# Patient Record
(demographics unavailable — no encounter records)

---

## 2024-10-09 NOTE — DISCUSSION/SUMMARY
[de-identified] : Reviewed all X Ray images with patient today and interpretation was provided. Stat MRI of right knee to eval ACL tear. Patient will follow up after MRI.     ----------------------------------------------- Home Exercise The patient is instructed on a home exercise program.  BEV CARDENAS Acting as a Scribe for Dr. Margaret TOLEDO, Bev Cardenas, attest that this documentation has been prepared under the direction and in the presence of Provider Dr. Robles.  Activity Modification The patient was advised to modify their activities.  Dx / Natural History The patient was advised of the diagnosis. The natural history of the pathology was explained in full to the patient in layman's terms. Several different treatment options were discussed and explained in full to the patient including the risks and benefits of both surgical and non-surgical treatments.  All questions and concerns were answered.  Pain Guide Activities The patient was advised to let pain guide the gradual advancement of activities.  RICE I explained to the patient that rest, ice, compression, and elevation would benefit them. They may return to activity after follow-up or when they no longer have any pain.  The patient's current medication management of their orthopedic diagnosis was reviewed today: (1) We discussed a comprehensive treatment plan that included possible pharmaceutical management involving the use of prescription strength medications including but not limited to options such as oral Naprosyn 500mg BID, once daily Meloxicam 15 mg, or 500-650 mg Tylenol versus over the counter oral medications and topical prescription NSAID Pennsaid vs over the counter Voltaren gel. (2) There is a moderate risk of morbidity with further treatment, especially from use of prescription strength medications and possible side effects of these medications which include upset stomach with oral medications, skin reactions to topical medications and cardiac/renal issues with long term use. (3) I recommended that the patient follow-up with their medical physician to discuss any significant specific potential issues with long term medication use such as interactions with current medications or with exacerbation of underlying medical comorbidities. (4) The benefits and risks associated with use of injectable, oral or topical, prescription and over the counter anti-inflammatory medications were discussed with the patient. The patient voiced understanding of the risks including but not limited to bleeding, stroke, kidney dysfunction, heart disease, and were referred to the black box warning label for further information.

## 2024-10-09 NOTE — PHYSICAL EXAM
[de-identified] : Neurovascularly intact distally   Right Knee:  No effusion, clean and dry incisions, intact skin, no fluctuance, no sign of infection, no wound erythema, no induration, no drainage. +lachman +anterior drawer Proximal MCL tenderness 1+ valgus with pain

## 2024-10-09 NOTE — PHYSICAL EXAM
[de-identified] : Neurovascularly intact distally   Right Knee:  No effusion, clean and dry incisions, intact skin, no fluctuance, no sign of infection, no wound erythema, no induration, no drainage. +lachman +anterior drawer Proximal MCL tenderness 1+ valgus with pain

## 2024-10-09 NOTE — DATA REVIEWED
[FreeTextEntry1] : 10/8/24 [facility] X-RAY Right Knee 4 views: This scan was reviewed and interpreted by Dr. Robles, and his findings are- well-placed hardware, otherwise unremarkable

## 2024-10-09 NOTE — ADDENDUM
[FreeTextEntry1] : Documented by Danny Wells acting as a scribe for Dr. Robles and Foster Johnson PA-C on 10/08/2024 and was presence for the following sections: Physical Exam; Data Reviewed; Assessment; Discussion/Summary. All medical record entries made by the Scribe were at my, Dr. Robles, and Foster Johnson, direction and personally dictated by me on 10/08/2024. I have reviewed the chart and agree that the record accurately reflects my personal performance of the history, physical exam, procedure and imaging.

## 2024-10-09 NOTE — DISCUSSION/SUMMARY
[de-identified] : Reviewed all X Ray images with patient today and interpretation was provided. Stat MRI of right knee to eval ACL tear. Patient will follow up after MRI.     ----------------------------------------------- Home Exercise The patient is instructed on a home exercise program.  BEV CARDENAS Acting as a Scribe for Dr. Margaret TOLEDO, Bev Cardenas, attest that this documentation has been prepared under the direction and in the presence of Provider Dr. Robles.  Activity Modification The patient was advised to modify their activities.  Dx / Natural History The patient was advised of the diagnosis. The natural history of the pathology was explained in full to the patient in layman's terms. Several different treatment options were discussed and explained in full to the patient including the risks and benefits of both surgical and non-surgical treatments.  All questions and concerns were answered.  Pain Guide Activities The patient was advised to let pain guide the gradual advancement of activities.  RICE I explained to the patient that rest, ice, compression, and elevation would benefit them. They may return to activity after follow-up or when they no longer have any pain.  The patient's current medication management of their orthopedic diagnosis was reviewed today: (1) We discussed a comprehensive treatment plan that included possible pharmaceutical management involving the use of prescription strength medications including but not limited to options such as oral Naprosyn 500mg BID, once daily Meloxicam 15 mg, or 500-650 mg Tylenol versus over the counter oral medications and topical prescription NSAID Pennsaid vs over the counter Voltaren gel. (2) There is a moderate risk of morbidity with further treatment, especially from use of prescription strength medications and possible side effects of these medications which include upset stomach with oral medications, skin reactions to topical medications and cardiac/renal issues with long term use. (3) I recommended that the patient follow-up with their medical physician to discuss any significant specific potential issues with long term medication use such as interactions with current medications or with exacerbation of underlying medical comorbidities. (4) The benefits and risks associated with use of injectable, oral or topical, prescription and over the counter anti-inflammatory medications were discussed with the patient. The patient voiced understanding of the risks including but not limited to bleeding, stroke, kidney dysfunction, heart disease, and were referred to the black box warning label for further information.

## 2024-10-09 NOTE — HISTORY OF PRESENT ILLNESS
[de-identified] : The patient is a 16 year old female presenting for FUV for the R knee. Pain: At Rest: 2/10 With Activity: 6/10 Mechanism of injury: Patient reported feeling a "pop" in the involved knee while playing basketball. This is NOT a Work Related Injury being treated under Worker's Compensation. This is NOT an athletic injury occurring associated with an interscholastic or organized sports team. Treatment/Imaging/Studies Since Last Visit: HEP   Reports Available For Review Today: NONE Out of work/sport: student School/Sport/Position/Occupation: [employer] Change since last visit: Patients states knee buckled and gave out on 10/4/24 at rec basketball game. Reports wearing brace at time of injury Additional Information: The patient is s/p R knee arthroscopic JOSAFAT, excision of cyclops lesion with limited synovectomy, open quadriceps tendon scar excision with revision scar closure. Date of Surgery: 10/19/23. D/C formal PT august 2024

## 2024-10-09 NOTE — HISTORY OF PRESENT ILLNESS
[de-identified] : The patient is a 16 year old female presenting for FUV for the R knee. Pain: At Rest: 2/10 With Activity: 6/10 Mechanism of injury: Patient reported feeling a "pop" in the involved knee while playing basketball. This is NOT a Work Related Injury being treated under Worker's Compensation. This is NOT an athletic injury occurring associated with an interscholastic or organized sports team. Treatment/Imaging/Studies Since Last Visit: HEP   Reports Available For Review Today: NONE Out of work/sport: student School/Sport/Position/Occupation: [employer] Change since last visit: Patients states knee buckled and gave out on 10/4/24 at rec basketball game. Reports wearing brace at time of injury Additional Information: The patient is s/p R knee arthroscopic JOSAFAT, excision of cyclops lesion with limited synovectomy, open quadriceps tendon scar excision with revision scar closure. Date of Surgery: 10/19/23. D/C formal PT august 2024

## 2024-10-13 NOTE — PHYSICAL EXAM
[de-identified] : Neurovascularly intact distally   Right Knee:  No effusion, clean and dry incisions, intact skin, no fluctuance, no sign of infection, no wound erythema, no induration, no drainage. +lachman +anterior drawer Proximal MCL tenderness Quad atrophy present 1+ valgus with pain

## 2024-10-13 NOTE — HISTORY OF PRESENT ILLNESS
[de-identified] : The patient is a 16 year old female presenting for FUV for the R knee. Pain: At Rest: 3/10 With Activity: 7/10 Mechanism of injury: Patient reported feeling a "pop" in the involved knee while playing basketball. This is NOT a Work Related Injury being treated under Worker's Compensation. This is NOT an athletic injury occurring associated with an interscholastic or organized sports team. Treatment/Imaging/Studies Since Last Visit: HEP, MRI @ [facility]  Reports Available For Review Today: NONE Out of work/sport: student School/Sport/Position/Occupation: [employer] Change since last visit: MRI  Additional Information: The patient is s/p R knee arthroscopic JOSAFAT, excision of cyclops lesion with limited synovectomy, open quadriceps tendon scar excision with revision scar closure. Date of Surgery: 10/19/23. D/C formal PT august 2024

## 2024-10-13 NOTE — DISCUSSION/SUMMARY
[de-identified] : Reviewed all MRI images with patient today and interpretation was provided. Physical therapy prescribed for strengthening and stretching.  Patient was advised to remain out of sports/gym for 1 month at this time. f/u 1 month for revaluation and to discuss clearance  ----------------------------------------------- Home Exercise The patient is instructed on a home exercise program.  Tone Coley Acting as a Scribe for Dr. Margaret TOLEDO, Tone Coley, attest that this documentation has been prepared under the direction and in the presence of Provider Dr. Robles.  Activity Modification The patient was advised to modify their activities.  Dx / Natural History The patient was advised of the diagnosis. The natural history of the pathology was explained in full to the patient in layman's terms. Several different treatment options were discussed and explained in full to the patient including the risks and benefits of both surgical and non-surgical treatments.  All questions and concerns were answered.  Pain Guide Activities The patient was advised to let pain guide the gradual advancement of activities.  ROSETTA TOLEDO explained to the patient that rest, ice, compression, and elevation would benefit them. They may return to activity after follow-up or when they no longer have any pain.  The patient's current medication management of their orthopedic diagnosis was reviewed today: (1) We discussed a comprehensive treatment plan that included possible pharmaceutical management involving the use of prescription strength medications including but not limited to options such as oral Naprosyn 500mg BID, once daily Meloxicam 15 mg, or 500-650 mg Tylenol versus over the counter oral medications and topical prescription NSAID Pennsaid vs over the counter Voltaren gel. (2) There is a moderate risk of morbidity with further treatment, especially from use of prescription strength medications and possible side effects of these medications which include upset stomach with oral medications, skin reactions to topical medications and cardiac/renal issues with long term use. (3) I recommended that the patient follow-up with their medical physician to discuss any significant specific potential issues with long term medication use such as interactions with current medications or with exacerbation of underlying medical comorbidities. (4) The benefits and risks associated with use of injectable, oral or topical, prescription and over the counter anti-inflammatory medications were discussed with the patient. The patient voiced understanding of the risks including but not limited to bleeding, stroke, kidney dysfunction, heart disease, and were referred to the black box warning label for further information.

## 2024-10-13 NOTE — PHYSICAL EXAM
[de-identified] : Neurovascularly intact distally   Right Knee:  No effusion, clean and dry incisions, intact skin, no fluctuance, no sign of infection, no wound erythema, no induration, no drainage. +lachman +anterior drawer Proximal MCL tenderness Quad atrophy present 1+ valgus with pain

## 2024-10-13 NOTE — DISCUSSION/SUMMARY
[de-identified] : Reviewed all MRI images with patient today and interpretation was provided. Physical therapy prescribed for strengthening and stretching.  Patient was advised to remain out of sports/gym for 1 month at this time. f/u 1 month for revaluation and to discuss clearance  ----------------------------------------------- Home Exercise The patient is instructed on a home exercise program.  Tone Coley Acting as a Scribe for Dr. Margaret TOLEDO, Tone Coley, attest that this documentation has been prepared under the direction and in the presence of Provider Dr. Robles.  Activity Modification The patient was advised to modify their activities.  Dx / Natural History The patient was advised of the diagnosis. The natural history of the pathology was explained in full to the patient in layman's terms. Several different treatment options were discussed and explained in full to the patient including the risks and benefits of both surgical and non-surgical treatments.  All questions and concerns were answered.  Pain Guide Activities The patient was advised to let pain guide the gradual advancement of activities.  ROSETTA TOLEDO explained to the patient that rest, ice, compression, and elevation would benefit them. They may return to activity after follow-up or when they no longer have any pain.  The patient's current medication management of their orthopedic diagnosis was reviewed today: (1) We discussed a comprehensive treatment plan that included possible pharmaceutical management involving the use of prescription strength medications including but not limited to options such as oral Naprosyn 500mg BID, once daily Meloxicam 15 mg, or 500-650 mg Tylenol versus over the counter oral medications and topical prescription NSAID Pennsaid vs over the counter Voltaren gel. (2) There is a moderate risk of morbidity with further treatment, especially from use of prescription strength medications and possible side effects of these medications which include upset stomach with oral medications, skin reactions to topical medications and cardiac/renal issues with long term use. (3) I recommended that the patient follow-up with their medical physician to discuss any significant specific potential issues with long term medication use such as interactions with current medications or with exacerbation of underlying medical comorbidities. (4) The benefits and risks associated with use of injectable, oral or topical, prescription and over the counter anti-inflammatory medications were discussed with the patient. The patient voiced understanding of the risks including but not limited to bleeding, stroke, kidney dysfunction, heart disease, and were referred to the black box warning label for further information.

## 2024-10-13 NOTE — HISTORY OF PRESENT ILLNESS
[de-identified] : The patient is a 16 year old female presenting for FUV for the R knee. Pain: At Rest: 3/10 With Activity: 7/10 Mechanism of injury: Patient reported feeling a "pop" in the involved knee while playing basketball. This is NOT a Work Related Injury being treated under Worker's Compensation. This is NOT an athletic injury occurring associated with an interscholastic or organized sports team. Treatment/Imaging/Studies Since Last Visit: HEP, MRI @ [facility]  Reports Available For Review Today: NONE Out of work/sport: student School/Sport/Position/Occupation: [employer] Change since last visit: MRI  Additional Information: The patient is s/p R knee arthroscopic JOSAFAT, excision of cyclops lesion with limited synovectomy, open quadriceps tendon scar excision with revision scar closure. Date of Surgery: 10/19/23. D/C formal PT august 2024

## 2024-10-13 NOTE — DATA REVIEWED
[FreeTextEntry1] : 10/9/24 [facility] MRI RT KNEE Sprain of ACL with no evidence of retear of Reconstruction graft

## 2024-11-13 NOTE — REVIEW OF SYSTEMS
Action Requested: Summary for Provider     []  Critical Lab, Recommendations Needed  [] Need Additional Advice  []   FYI    []   Need Orders  [] Need Medications Sent to Pharmacy  []  Other     SUMMARY: Patient requests appt with LB for intermittent wheezi [Negative] : Heme/Lymph

## 2024-11-16 NOTE — HISTORY OF PRESENT ILLNESS
[de-identified] : The patient is a 16 year old female presenting for FUV for the R knee. Pain: At Rest: 3/10 With Activity: 7/10 Mechanism of injury: Patient reported feeling a "pop" in the involved knee while playing basketball. This is NOT a Work Related Injury being treated under Worker's Compensation. This is NOT an athletic injury occurring associated with an interscholastic or organized sports team. Treatment/Imaging/Studies Since Last Visit: HEP, MRI @ [facility]  Reports Available For Review Today: NONE Out of work/sport: student School/Sport/Position/Occupation: [employer] Change since last visit: MRI  Additional Information: The patient is s/p R knee arthroscopic JOSAFAT, excision of cyclops lesion with limited synovectomy, open quadriceps tendon scar excision with revision scar closure. Date of Surgery: 10/19/23. D/C formal PT august 2024 11/13/24: Follow up visit for right knee pain. Patient admits to finishing PT and feeling very well. Request back to gym note.

## 2024-11-16 NOTE — ADDENDUM
[FreeTextEntry1] : Documented by Danny Wells acting as a scribe for Dr. Robles and Foster Johnson PA-C on 11/13/2024 and was presence for the following sections: Physical Exam; Data Reviewed; Assessment; Discussion/Summary. All medical record entries made by the Scribe were at my, Dr. Robles, and Foster Johnson, direction and personally dictated by me on 11/13/2024. I have reviewed the chart and agree that the record accurately reflects my personal performance of the history, physical exam, procedure and imaging.

## 2024-11-16 NOTE — DISCUSSION/SUMMARY
[de-identified] : Patient is doing well during today's visit. Patient will continue physical therapy for strengthening and stretching. Patient is cleared for gym and sport. Patient advised to ease into activity. Patient will follow up as needed.     ----------------------------------------------- Home Exercise The patient is instructed on a home exercise program.  BEV CARDENAS Acting as a Scribe for Dr. Margaret TOLEDO, Bev Cardenas, attest that this documentation has been prepared under the direction and in the presence of Provider Dr. Robles.  Activity Modification The patient was advised to modify their activities.  Dx / Natural History The patient was advised of the diagnosis. The natural history of the pathology was explained in full to the patient in layman's terms. Several different treatment options were discussed and explained in full to the patient including the risks and benefits of both surgical and non-surgical treatments.  All questions and concerns were answered.  Pain Guide Activities The patient was advised to let pain guide the gradual advancement of activities.  RICE I explained to the patient that rest, ice, compression, and elevation would benefit them. They may return to activity after follow-up or when they no longer have any pain.  The patient's current medication management of their orthopedic diagnosis was reviewed today: (1) We discussed a comprehensive treatment plan that included possible pharmaceutical management involving the use of prescription strength medications including but not limited to options such as oral Naprosyn 500mg BID, once daily Meloxicam 15 mg, or 500-650 mg Tylenol versus over the counter oral medications and topical prescription NSAID Pennsaid vs over the counter Voltaren gel. (2) There is a moderate risk of morbidity with further treatment, especially from use of prescription strength medications and possible side effects of these medications which include upset stomach with oral medications, skin reactions to topical medications and cardiac/renal issues with long term use. (3) I recommended that the patient follow-up with their medical physician to discuss any significant specific potential issues with long term medication use such as interactions with current medications or with exacerbation of underlying medical comorbidities. (4) The benefits and risks associated with use of injectable, oral or topical, prescription and over the counter anti-inflammatory medications were discussed with the patient. The patient voiced understanding of the risks including but not limited to bleeding, stroke, kidney dysfunction, heart disease, and were referred to the black box warning label for further information.

## 2024-11-16 NOTE — DISCUSSION/SUMMARY
[de-identified] : Patient is doing well during today's visit. Patient will continue physical therapy for strengthening and stretching. Patient is cleared for gym and sport. Patient advised to ease into activity. Patient will follow up as needed.     ----------------------------------------------- Home Exercise The patient is instructed on a home exercise program.  BEV CARDENAS Acting as a Scribe for Dr. Margaret TOLEDO, Bev Cardenas, attest that this documentation has been prepared under the direction and in the presence of Provider Dr. Robles.  Activity Modification The patient was advised to modify their activities.  Dx / Natural History The patient was advised of the diagnosis. The natural history of the pathology was explained in full to the patient in layman's terms. Several different treatment options were discussed and explained in full to the patient including the risks and benefits of both surgical and non-surgical treatments.  All questions and concerns were answered.  Pain Guide Activities The patient was advised to let pain guide the gradual advancement of activities.  RICE I explained to the patient that rest, ice, compression, and elevation would benefit them. They may return to activity after follow-up or when they no longer have any pain.  The patient's current medication management of their orthopedic diagnosis was reviewed today: (1) We discussed a comprehensive treatment plan that included possible pharmaceutical management involving the use of prescription strength medications including but not limited to options such as oral Naprosyn 500mg BID, once daily Meloxicam 15 mg, or 500-650 mg Tylenol versus over the counter oral medications and topical prescription NSAID Pennsaid vs over the counter Voltaren gel. (2) There is a moderate risk of morbidity with further treatment, especially from use of prescription strength medications and possible side effects of these medications which include upset stomach with oral medications, skin reactions to topical medications and cardiac/renal issues with long term use. (3) I recommended that the patient follow-up with their medical physician to discuss any significant specific potential issues with long term medication use such as interactions with current medications or with exacerbation of underlying medical comorbidities. (4) The benefits and risks associated with use of injectable, oral or topical, prescription and over the counter anti-inflammatory medications were discussed with the patient. The patient voiced understanding of the risks including but not limited to bleeding, stroke, kidney dysfunction, heart disease, and were referred to the black box warning label for further information.

## 2024-11-16 NOTE — HISTORY OF PRESENT ILLNESS
[de-identified] : The patient is a 16 year old female presenting for FUV for the R knee. Pain: At Rest: 3/10 With Activity: 7/10 Mechanism of injury: Patient reported feeling a "pop" in the involved knee while playing basketball. This is NOT a Work Related Injury being treated under Worker's Compensation. This is NOT an athletic injury occurring associated with an interscholastic or organized sports team. Treatment/Imaging/Studies Since Last Visit: HEP, MRI @ [facility]  Reports Available For Review Today: NONE Out of work/sport: student School/Sport/Position/Occupation: [employer] Change since last visit: MRI  Additional Information: The patient is s/p R knee arthroscopic JOSAFAT, excision of cyclops lesion with limited synovectomy, open quadriceps tendon scar excision with revision scar closure. Date of Surgery: 10/19/23. D/C formal PT august 2024 11/13/24: Follow up visit for right knee pain. Patient admits to finishing PT and feeling very well. Request back to gym note.

## 2024-11-16 NOTE — PHYSICAL EXAM
[de-identified] : Neurovascularly intact distally   Right Knee:  No effusion, clean and dry incisions, intact skin, no fluctuance, no sign of infection, no wound erythema, no induration, no drainage. 5-/5 quad strength can straight leg raise against resistance can perform piston squat no pain on exam full ROM no effusion ligamental stable -lachman

## 2024-11-16 NOTE — PHYSICAL EXAM
[de-identified] : Neurovascularly intact distally   Right Knee:  No effusion, clean and dry incisions, intact skin, no fluctuance, no sign of infection, no wound erythema, no induration, no drainage. 5-/5 quad strength can straight leg raise against resistance can perform piston squat no pain on exam full ROM no effusion ligamental stable -lachman

## 2024-12-13 NOTE — REVIEW OF SYSTEMS
Roxana Godoy is calling to request a refill on the following medication(s):    Medication Request:  Requested Prescriptions     Pending Prescriptions Disp Refills    venlafaxine (EFFEXOR XR) 150 MG extended release capsule 90 capsule 0       Last Visit Date (If Applicable):  9/23/1104 In office    Next Visit Date:    Visit date not found [Joint Pain] : joint pain [Negative] : Heme/Lymph

## 2024-12-15 NOTE — HISTORY OF PRESENT ILLNESS
[de-identified] : The patient is a 16 year old [RIGHT/LEFT] hand dominant female who presents today complaining of RT KNEE pain Date of Injury/Onset:  Yesterday Pain:    At Rest:  4/10  With Activity:  7/10  Mechanism of injury: Patient was playing basketball yesterday and came down from layout with brace on and her leg gave out.  This is NOT a Work Related Injury being treated under Worker's Compensation. This is NOT an athletic injury occurring associated with an interscholastic or organized sports team. Quality of symptoms:  Black and blue, swollen pain on outer part of knee.  Improves with: Icing and keeping it straight  Worse with: bending  Prior treatment: _ Prior Imaging: _ Out of work/sport: Basketball since yesterday School/Sport/Position/Occupation: Basketball at [employer]  Additional Information: Patient states this is not the first time it gave out or tweaked. History of torn ACL.

## 2024-12-15 NOTE — HISTORY OF PRESENT ILLNESS
[de-identified] : The patient is a 16 year old [RIGHT/LEFT] hand dominant female who presents today complaining of RT KNEE pain Date of Injury/Onset:  Yesterday Pain:    At Rest:  4/10  With Activity:  7/10  Mechanism of injury: Patient was playing basketball yesterday and came down from layout with brace on and her leg gave out.  This is NOT a Work Related Injury being treated under Worker's Compensation. This is NOT an athletic injury occurring associated with an interscholastic or organized sports team. Quality of symptoms:  Black and blue, swollen pain on outer part of knee.  Improves with: Icing and keeping it straight  Worse with: bending  Prior treatment: _ Prior Imaging: _ Out of work/sport: Basketball since yesterday School/Sport/Position/Occupation: Basketball at [employer]  Additional Information: Patient states this is not the first time it gave out or tweaked. History of torn ACL.

## 2024-12-15 NOTE — DATA REVIEWED
[FreeTextEntry1] : 12/13/24 [facility] X-Ray Examination of the RIGHT KNEE: 4 views:  This scan was reviewed and interpreted by Dr. Robles, and his findings are- Unremarkable

## 2024-12-15 NOTE — DISCUSSION/SUMMARY
[de-identified] :  Reviewed all X-ray images with patient, interpretation was provided. Stat MRI of the right knee to evaluate for ACL tear Follow up after scan.    ----------------------------------------------- Home Exercise The patient is instructed on a home exercise program.  LYNN PASTOR Acting as a Scribe for Dr. Margaret TOLEDO, Lynn Pastor, attest that this documentation has been prepared under the direction and in the presence of Provider Rafi Robles MD.  Activity Modification The patient was advised to modify their activities.  Dx / Natural History The patient was advised of the diagnosis.  The natural history of the pathology was explained in full to the patient in layman's terms.  Several different treatment options were discussed and explained in full to the patient including the risks and benefits of both surgical and non-surgical treatments.  All questions and concerns were answered.  Pain Guide Activities The patient was advised to let pain guide the gradual advancement of activities.  RICE I explained to the patient that rest, ice, compression, and elevation would benefit them.  They may return to activity after follow-up or when they no longer have any pain.  The patient's current medication management of their orthopedic diagnosis was reviewed today: (1) We discussed a comprehensive treatment plan that included possible pharmaceutical management involving the use of prescription strength medications including but not limited to options such as oral Naprosyn 500mg BID, once daily Meloxicam 15 mg, or 500-650 mg Tylenol versus over the counter oral medications and topical prescription NSAID Pennsaid vs over the counter Voltaren gel. (2) There is a moderate risk of morbidity with further treatment, especially from use of prescription strength medications and possible side effects of these medications which include upset stomach with oral medications, skin reactions to topical medications and cardiac/renal issues with long term use. (3) I recommended that the patient follow-up with their medical physician to discuss any significant specific potential issues with long term medication use such as interactions with current medications or with exacerbation of underlying medical comorbidities. (4) The benefits and risks associated with use of injectable, oral or topical, prescription and over the counter anti-inflammatory medications were discussed with the patient. The patient voiced understanding of the risks including but not limited to bleeding, stroke, kidney dysfunction, heart disease, and were referred to the black box warning label for further information.

## 2024-12-15 NOTE — DISCUSSION/SUMMARY
[de-identified] :  Reviewed all X-ray images with patient, interpretation was provided. Stat MRI of the right knee to evaluate for ACL tear Follow up after scan.    ----------------------------------------------- Home Exercise The patient is instructed on a home exercise program.  LYNN PASTOR Acting as a Scribe for Dr. Margaret TOLEDO, Lynn Pastor, attest that this documentation has been prepared under the direction and in the presence of Provider Rafi Robles MD.  Activity Modification The patient was advised to modify their activities.  Dx / Natural History The patient was advised of the diagnosis.  The natural history of the pathology was explained in full to the patient in layman's terms.  Several different treatment options were discussed and explained in full to the patient including the risks and benefits of both surgical and non-surgical treatments.  All questions and concerns were answered.  Pain Guide Activities The patient was advised to let pain guide the gradual advancement of activities.  RICE I explained to the patient that rest, ice, compression, and elevation would benefit them.  They may return to activity after follow-up or when they no longer have any pain.  The patient's current medication management of their orthopedic diagnosis was reviewed today: (1) We discussed a comprehensive treatment plan that included possible pharmaceutical management involving the use of prescription strength medications including but not limited to options such as oral Naprosyn 500mg BID, once daily Meloxicam 15 mg, or 500-650 mg Tylenol versus over the counter oral medications and topical prescription NSAID Pennsaid vs over the counter Voltaren gel. (2) There is a moderate risk of morbidity with further treatment, especially from use of prescription strength medications and possible side effects of these medications which include upset stomach with oral medications, skin reactions to topical medications and cardiac/renal issues with long term use. (3) I recommended that the patient follow-up with their medical physician to discuss any significant specific potential issues with long term medication use such as interactions with current medications or with exacerbation of underlying medical comorbidities. (4) The benefits and risks associated with use of injectable, oral or topical, prescription and over the counter anti-inflammatory medications were discussed with the patient. The patient voiced understanding of the risks including but not limited to bleeding, stroke, kidney dysfunction, heart disease, and were referred to the black box warning label for further information.

## 2024-12-15 NOTE — PHYSICAL EXAM
[de-identified] : Neurologic: normal sensation, normal mood and affect, orientated and able to communicate   Constitutional: well developed and well nourished   Right Knee:  Inspection of the knee is as follows: large effusion. Palpation of the knee is as follows: MCL tenderness. Knee Range of Motion is as follows: Extension: 0 degrees. Flexion: 110 degrees. There is guarding during exam, Anterior pain with flexion and Range of motion is limited secondary to pain. Strength examination of the knee is as follows: Quadriceps strength is 5/5 Hamstring strength is 5/5 Ligament Stability and Special Test positive anterior draw and positive Lachman test. negative posterior draw and no varus or valgus instability. 1+ valgus at 30 deg Neurological examination of the knee is as follows: light touch is intact throughout. Gait and function is as follows: mildly antalgic gait.

## 2024-12-15 NOTE — PHYSICAL EXAM
[de-identified] : Neurologic: normal sensation, normal mood and affect, orientated and able to communicate   Constitutional: well developed and well nourished   Right Knee:  Inspection of the knee is as follows: large effusion. Palpation of the knee is as follows: MCL tenderness. Knee Range of Motion is as follows: Extension: 0 degrees. Flexion: 110 degrees. There is guarding during exam, Anterior pain with flexion and Range of motion is limited secondary to pain. Strength examination of the knee is as follows: Quadriceps strength is 5/5 Hamstring strength is 5/5 Ligament Stability and Special Test positive anterior draw and positive Lachman test. negative posterior draw and no varus or valgus instability. 1+ valgus at 30 deg Neurological examination of the knee is as follows: light touch is intact throughout. Gait and function is as follows: mildly antalgic gait.

## 2024-12-15 NOTE — PHYSICAL EXAM
[de-identified] : Neurologic: normal sensation, normal mood and affect, orientated and able to communicate   Constitutional: well developed and well nourished   Right Knee:  Inspection of the knee is as follows: large effusion. Palpation of the knee is as follows: MCL tenderness. Knee Range of Motion is as follows: Extension: 0 degrees. Flexion: 110 degrees. There is guarding during exam, Anterior pain with flexion and Range of motion is limited secondary to pain. Strength examination of the knee is as follows: Quadriceps strength is 5/5 Hamstring strength is 5/5 Ligament Stability and Special Test positive anterior draw and positive Lachman test. negative posterior draw and no varus or valgus instability. 1+ valgus at 30 deg Neurological examination of the knee is as follows: light touch is intact throughout. Gait and function is as follows: mildly antalgic gait.

## 2024-12-15 NOTE — DISCUSSION/SUMMARY
[de-identified] :  Reviewed all X-ray images with patient, interpretation was provided. Stat MRI of the right knee to evaluate for ACL tear Follow up after scan.    ----------------------------------------------- Home Exercise The patient is instructed on a home exercise program.  LYNN PASTOR Acting as a Scribe for Dr. Margaret TOLEDO, Lynn Pastor, attest that this documentation has been prepared under the direction and in the presence of Provider Rafi Robles MD.  Activity Modification The patient was advised to modify their activities.  Dx / Natural History The patient was advised of the diagnosis.  The natural history of the pathology was explained in full to the patient in layman's terms.  Several different treatment options were discussed and explained in full to the patient including the risks and benefits of both surgical and non-surgical treatments.  All questions and concerns were answered.  Pain Guide Activities The patient was advised to let pain guide the gradual advancement of activities.  RICE I explained to the patient that rest, ice, compression, and elevation would benefit them.  They may return to activity after follow-up or when they no longer have any pain.  The patient's current medication management of their orthopedic diagnosis was reviewed today: (1) We discussed a comprehensive treatment plan that included possible pharmaceutical management involving the use of prescription strength medications including but not limited to options such as oral Naprosyn 500mg BID, once daily Meloxicam 15 mg, or 500-650 mg Tylenol versus over the counter oral medications and topical prescription NSAID Pennsaid vs over the counter Voltaren gel. (2) There is a moderate risk of morbidity with further treatment, especially from use of prescription strength medications and possible side effects of these medications which include upset stomach with oral medications, skin reactions to topical medications and cardiac/renal issues with long term use. (3) I recommended that the patient follow-up with their medical physician to discuss any significant specific potential issues with long term medication use such as interactions with current medications or with exacerbation of underlying medical comorbidities. (4) The benefits and risks associated with use of injectable, oral or topical, prescription and over the counter anti-inflammatory medications were discussed with the patient. The patient voiced understanding of the risks including but not limited to bleeding, stroke, kidney dysfunction, heart disease, and were referred to the black box warning label for further information.

## 2024-12-15 NOTE — HISTORY OF PRESENT ILLNESS
[de-identified] : The patient is a 16 year old [RIGHT/LEFT] hand dominant female who presents today complaining of RT KNEE pain Date of Injury/Onset:  Yesterday Pain:    At Rest:  4/10  With Activity:  7/10  Mechanism of injury: Patient was playing basketball yesterday and came down from layout with brace on and her leg gave out.  This is NOT a Work Related Injury being treated under Worker's Compensation. This is NOT an athletic injury occurring associated with an interscholastic or organized sports team. Quality of symptoms:  Black and blue, swollen pain on outer part of knee.  Improves with: Icing and keeping it straight  Worse with: bending  Prior treatment: _ Prior Imaging: _ Out of work/sport: Basketball since yesterday School/Sport/Position/Occupation: Basketball at [employer]  Additional Information: Patient states this is not the first time it gave out or tweaked. History of torn ACL.

## 2024-12-17 NOTE — DISCUSSION/SUMMARY
[de-identified] : Reviewed all MRI images with patient today and interpretation was provided. Patient was given prescription of formal physical therapy for strengthening and stretching. TIBIA BONE BRUISE Patient will follow up as needed.     ***Currently at  [facility] [place], recommended to [facility] at [facility] in [place]***     ----------------------------------------------- Home Exercise The patient is instructed on a home exercise program.  BEV CARDENAS Acting as a Scribe for Dr. Margaret TOLEDO, Bev Cardenas, attest that this documentation has been prepared under the direction and in the presence of Provider Dr. Robles.  Activity Modification The patient was advised to modify their activities.  Dx / Natural History The patient was advised of the diagnosis. The natural history of the pathology was explained in full to the patient in layman's terms. Several different treatment options were discussed and explained in full to the patient including the risks and benefits of both surgical and non-surgical treatments.  All questions and concerns were answered.  Pain Guide Activities The patient was advised to let pain guide the gradual advancement of activities.  RICE I explained to the patient that rest, ice, compression, and elevation would benefit them. They may return to activity after follow-up or when they no longer have any pain.  The patient's current medication management of their orthopedic diagnosis was reviewed today: (1) We discussed a comprehensive treatment plan that included possible pharmaceutical management involving the use of prescription strength medications including but not limited to options such as oral Naprosyn 500mg BID, once daily Meloxicam 15 mg, or 500-650 mg Tylenol versus over the counter oral medications and topical prescription NSAID Pennsaid vs over the counter Voltaren gel. (2) There is a moderate risk of morbidity with further treatment, especially from use of prescription strength medications and possible side effects of these medications which include upset stomach with oral medications, skin reactions to topical medications and cardiac/renal issues with long term use. (3) I recommended that the patient follow-up with their medical physician to discuss any significant specific potential issues with long term medication use such as interactions with current medications or with exacerbation of underlying medical comorbidities. (4) The benefits and risks associated with use of injectable, oral or topical, prescription and over the counter anti-inflammatory medications were discussed with the patient. The patient voiced understanding of the risks including but not limited to bleeding, stroke, kidney dysfunction, heart disease, and were referred to the black box warning label for further information.

## 2024-12-17 NOTE — HISTORY OF PRESENT ILLNESS
[de-identified] : The patient is a 16 year old [RIGHT/LEFT] hand dominant female who presents today complaining of RT KNEE pain Date of Injury/Onset:  Yesterday Pain:    At Rest:  4/10  With Activity:  7/10  Mechanism of injury: Patient was playing basketball yesterday and came down from layout with brace on and her leg gave out.  This is NOT a Work Related Injury being treated under Worker's Compensation. This is NOT an athletic injury occurring associated with an interscholastic or organized sports team. Quality of symptoms:  Black and blue, swollen pain on outer part of knee.  Improves with: Icing and keeping it straight  Worse with: bending  Treatment/Imaging/Studies Since Last Visit: MRI 	Reports Available For Review Today: MRI @ [facility] 12/14/24 Change since last visit: Patient reprots she feels the same as last visit. Out of work/sport: Basketball since yesterday School/Sport/Position/Occupation: Basketball at [employer]  Additional Information: Patient states this is not the first time it gave out or tweaked. History of torn ACL.

## 2024-12-17 NOTE — ADDENDUM
[FreeTextEntry1] : Documented by Danny Wells acting as a scribe for Dr. Robles and Foster Johnson PA-C on 12/17/2024 and was presence for the following sections: Physical Exam; Data Reviewed; Assessment; Discussion/Summary. All medical record entries made by the Scribe were at my, Dr. Robles, and Foster Johnsno, direction and personally dictated by me on 12/17/2024. I have reviewed the chart and agree that the record accurately reflects my personal performance of the history, physical exam, procedure and imaging.

## 2024-12-17 NOTE — DATA REVIEWED
[FreeTextEntry1] : 12/13/24 [facility] X-Ray Examination of the RIGHT KNEE: 4 views:  This scan was reviewed and interpreted by Dr. Robles, and his findings are- Unremarkable  12/14/24 [facility] MRI Right Knee: This scan was reviewed and interpreted by Dr. Robles, and his findings are- Impression: 1. Nonspecific mild thickening of ACL graft fibers with surrounding synovitis potentially related to recurrent sprain without discontinuity and either stress reaction or postoperative changes surrounding the distal footprint without secondary evidence of recent anterior tibial translation episode. 2. Slight patellofemoral effusion and synovitis and postoperative change without meniscal tear or loose body.

## 2024-12-17 NOTE — PHYSICAL EXAM
[de-identified] : Neurologic: normal sensation, normal mood and affect, orientated and able to communicate   Constitutional: well developed and well nourished   Right Knee:  Inspection of the knee is as follows: large effusion. Palpation of the knee is as follows: MCL tenderness. Knee Range of Motion is as follows: Extension: 0 degrees. Flexion: 110 degrees. There is guarding during exam, Anterior pain with flexion and Range of motion is limited secondary to pain. Strength examination of the knee is as follows: Quadriceps strength is 5/5 Hamstring strength is 5/5 Ligament Stability and Special Test positive anterior draw and positive Lachman test. negative posterior draw and no varus or valgus instability. 1+ valgus at 30 deg Neurological examination of the knee is as follows: light touch is intact throughout. Gait and function is as follows: mildly antalgic gait.

## 2025-01-19 NOTE — HISTORY OF PRESENT ILLNESS
[de-identified] : The patient is a 16 year old [RIGHT] hand dominant female who presents today complaining of RT KNEE pain Date of Injury/Onset:  Yesterday Pain:    At Rest:  0/10  With Activity:  2/10  Mechanism of injury: Patient was playing basketball yesterday and came down from layout with brace on and her leg gave out.  This is NOT a Work Related Injury being treated under Worker's Compensation. This is NOT an athletic injury occurring associated with an interscholastic or organized sports team. Quality of symptoms:  Black and blue, swollen pain on outer part of knee.  Improves with: Icing and keeping it straight  Worse with: bending  Treatment/Imaging/Studies Since Last Visit: PT @ [facility] in [facility]- working on functional strengthen and plyometrics and agilities 	Reports Available For Review Today: MRI @ [facility] 12/14/24 Change since last visit: Patient reports mild discomfort with more advanced therex that resolves with stopping activity. Reports PT think she just needs to build up strength. Would like to continue PT Out of work/sport: Basketball since yesterday School/Sport/Position/Occupation: Basketball at [employer]  Additional Information: Patient states this is not the first time it gave out or tweaked. History of torn ACL.

## 2025-01-19 NOTE — ADDENDUM
[FreeTextEntry1] : Documented by Danny Wells acting as a scribe for Dr. Robles and Foster Johnson PA-C on 01/15/2025 and was presence for the following sections: Physical Exam; Data Reviewed; Assessment; Discussion/Summary. All medical record entries made by the Scribe were at my, Dr. Robles, and Foster Johnson, direction and personally dictated by me on 01/15/2025. I have reviewed the chart and agree that the record accurately reflects my personal performance of the history, physical exam, procedure and imaging.

## 2025-01-19 NOTE — DISCUSSION/SUMMARY
[de-identified] : Patient is progressing well with physical therapy. Patient cleared for sport and gym. Patient advised to ease into sport activity. Patient will continue physical therapy for strengthening and stretching. Patient will follow up as needed.     *Working with [facility] at [facility] in [place]*     ----------------------------------------------- Home Exercise The patient is instructed on a home exercise program.  BEV CARDENAS Acting as a Scribe for Dr. Margaret TOLEDO, Bev Cardenas, attest that this documentation has been prepared under the direction and in the presence of Provider Dr. Robles.  Activity Modification The patient was advised to modify their activities.  Dx / Natural History The patient was advised of the diagnosis. The natural history of the pathology was explained in full to the patient in layman's terms. Several different treatment options were discussed and explained in full to the patient including the risks and benefits of both surgical and non-surgical treatments.  All questions and concerns were answered.  Pain Guide Activities The patient was advised to let pain guide the gradual advancement of activities.  RICE I explained to the patient that rest, ice, compression, and elevation would benefit them. They may return to activity after follow-up or when they no longer have any pain.  The patient's current medication management of their orthopedic diagnosis was reviewed today: (1) We discussed a comprehensive treatment plan that included possible pharmaceutical management involving the use of prescription strength medications including but not limited to options such as oral Naprosyn 500mg BID, once daily Meloxicam 15 mg, or 500-650 mg Tylenol versus over the counter oral medications and topical prescription NSAID Pennsaid vs over the counter Voltaren gel. (2) There is a moderate risk of morbidity with further treatment, especially from use of prescription strength medications and possible side effects of these medications which include upset stomach with oral medications, skin reactions to topical medications and cardiac/renal issues with long term use. (3) I recommended that the patient follow-up with their medical physician to discuss any significant specific potential issues with long term medication use such as interactions with current medications or with exacerbation of underlying medical comorbidities. (4) The benefits and risks associated with use of injectable, oral or topical, prescription and over the counter anti-inflammatory medications were discussed with the patient. The patient voiced understanding of the risks including but not limited to bleeding, stroke, kidney dysfunction, heart disease, and were referred to the black box warning label for further information.

## 2025-01-19 NOTE — PHYSICAL EXAM
[de-identified] : Neurologic: normal sensation, normal mood and affect, orientated and able to communicate Constitutional: well developed and well nourished   Right Knee: Full ROM Ligamental stable Nontender no effusion

## 2025-01-19 NOTE — DISCUSSION/SUMMARY
[de-identified] : Patient is progressing well with physical therapy. Patient cleared for sport and gym. Patient advised to ease into sport activity. Patient will continue physical therapy for strengthening and stretching. Patient will follow up as needed.     *Working with [facility] at [facility] in [place]*     ----------------------------------------------- Home Exercise The patient is instructed on a home exercise program.  BEV CARDENAS Acting as a Scribe for Dr. Margaret TLOEDO, Bev Cardenas, attest that this documentation has been prepared under the direction and in the presence of Provider Dr. Robles.  Activity Modification The patient was advised to modify their activities.  Dx / Natural History The patient was advised of the diagnosis. The natural history of the pathology was explained in full to the patient in layman's terms. Several different treatment options were discussed and explained in full to the patient including the risks and benefits of both surgical and non-surgical treatments.  All questions and concerns were answered.  Pain Guide Activities The patient was advised to let pain guide the gradual advancement of activities.  RICE I explained to the patient that rest, ice, compression, and elevation would benefit them. They may return to activity after follow-up or when they no longer have any pain.  The patient's current medication management of their orthopedic diagnosis was reviewed today: (1) We discussed a comprehensive treatment plan that included possible pharmaceutical management involving the use of prescription strength medications including but not limited to options such as oral Naprosyn 500mg BID, once daily Meloxicam 15 mg, or 500-650 mg Tylenol versus over the counter oral medications and topical prescription NSAID Pennsaid vs over the counter Voltaren gel. (2) There is a moderate risk of morbidity with further treatment, especially from use of prescription strength medications and possible side effects of these medications which include upset stomach with oral medications, skin reactions to topical medications and cardiac/renal issues with long term use. (3) I recommended that the patient follow-up with their medical physician to discuss any significant specific potential issues with long term medication use such as interactions with current medications or with exacerbation of underlying medical comorbidities. (4) The benefits and risks associated with use of injectable, oral or topical, prescription and over the counter anti-inflammatory medications were discussed with the patient. The patient voiced understanding of the risks including but not limited to bleeding, stroke, kidney dysfunction, heart disease, and were referred to the black box warning label for further information.

## 2025-01-19 NOTE — PHYSICAL EXAM
[de-identified] : Neurologic: normal sensation, normal mood and affect, orientated and able to communicate Constitutional: well developed and well nourished   Right Knee: Full ROM Ligamental stable Nontender no effusion

## 2025-01-19 NOTE — HISTORY OF PRESENT ILLNESS
[de-identified] : The patient is a 16 year old [RIGHT] hand dominant female who presents today complaining of RT KNEE pain Date of Injury/Onset:  Yesterday Pain:    At Rest:  0/10  With Activity:  2/10  Mechanism of injury: Patient was playing basketball yesterday and came down from layout with brace on and her leg gave out.  This is NOT a Work Related Injury being treated under Worker's Compensation. This is NOT an athletic injury occurring associated with an interscholastic or organized sports team. Quality of symptoms:  Black and blue, swollen pain on outer part of knee.  Improves with: Icing and keeping it straight  Worse with: bending  Treatment/Imaging/Studies Since Last Visit: [facility] @ [facility]- working on functional strengthen and plyometrics and agilities 	Reports Available For Review Today: MRI @ [facility] 12/14/24 Change since last visit: Patient reports mild discomfort with more advanced therex that resolves with stopping activity. Reports PT think she just needs to build up strength. Would like to continue PT Out of work/sport: Basketball since yesterday School/Sport/Position/Occupation: Basketball at [employer]  Additional Information: Patient states this is not the first time it gave out or tweaked. History of torn ACL.